# Patient Record
Sex: FEMALE | Race: WHITE | NOT HISPANIC OR LATINO | Employment: FULL TIME | URBAN - METROPOLITAN AREA
[De-identification: names, ages, dates, MRNs, and addresses within clinical notes are randomized per-mention and may not be internally consistent; named-entity substitution may affect disease eponyms.]

---

## 2021-09-20 ENCOUNTER — APPOINTMENT (OUTPATIENT)
Dept: WOMENS IMAGING | Facility: HOSPITAL | Age: 41
End: 2021-09-20

## 2021-09-20 PROCEDURE — 77063 BREAST TOMOSYNTHESIS BI: CPT | Performed by: RADIOLOGY

## 2021-09-20 PROCEDURE — 77067 SCR MAMMO BI INCL CAD: CPT | Performed by: RADIOLOGY

## 2021-11-24 ENCOUNTER — APPOINTMENT (OUTPATIENT)
Dept: WOMENS IMAGING | Facility: HOSPITAL | Age: 41
End: 2021-11-24

## 2021-11-24 PROCEDURE — 76641 ULTRASOUND BREAST COMPLETE: CPT | Performed by: RADIOLOGY

## 2021-11-24 PROCEDURE — 77065 DX MAMMO INCL CAD UNI: CPT | Performed by: RADIOLOGY

## 2021-11-24 PROCEDURE — 77061 BREAST TOMOSYNTHESIS UNI: CPT | Performed by: RADIOLOGY

## 2021-11-24 PROCEDURE — G0279 TOMOSYNTHESIS, MAMMO: HCPCS | Performed by: RADIOLOGY

## 2022-05-06 ENCOUNTER — OFFICE VISIT (OUTPATIENT)
Dept: FAMILY MEDICINE CLINIC | Age: 42
End: 2022-05-06

## 2022-05-06 ENCOUNTER — LAB (OUTPATIENT)
Dept: LAB | Facility: HOSPITAL | Age: 42
End: 2022-05-06

## 2022-05-06 VITALS
HEIGHT: 67 IN | HEART RATE: 82 BPM | DIASTOLIC BLOOD PRESSURE: 73 MMHG | BODY MASS INDEX: 21.6 KG/M2 | WEIGHT: 137.6 LBS | OXYGEN SATURATION: 97 % | SYSTOLIC BLOOD PRESSURE: 107 MMHG

## 2022-05-06 DIAGNOSIS — F41.1 ANXIETY, GENERALIZED: ICD-10-CM

## 2022-05-06 DIAGNOSIS — Z13.6 SCREENING FOR CARDIOVASCULAR CONDITION: ICD-10-CM

## 2022-05-06 DIAGNOSIS — Z00.00 ROUTINE GENERAL MEDICAL EXAMINATION AT A HEALTH CARE FACILITY: Primary | ICD-10-CM

## 2022-05-06 DIAGNOSIS — M79.671 RIGHT FOOT PAIN: ICD-10-CM

## 2022-05-06 LAB
ALBUMIN SERPL-MCNC: 4.3 G/DL (ref 3.5–5.2)
ALBUMIN/GLOB SERPL: 1.7 G/DL
ALP SERPL-CCNC: 52 U/L (ref 39–117)
ALT SERPL W P-5'-P-CCNC: 10 U/L (ref 1–33)
ANION GAP SERPL CALCULATED.3IONS-SCNC: 8.8 MMOL/L (ref 5–15)
AST SERPL-CCNC: 17 U/L (ref 1–32)
BILIRUB SERPL-MCNC: 0.2 MG/DL (ref 0–1.2)
BUN SERPL-MCNC: 10 MG/DL (ref 6–20)
BUN/CREAT SERPL: 13.7 (ref 7–25)
CALCIUM SPEC-SCNC: 9 MG/DL (ref 8.6–10.5)
CHLORIDE SERPL-SCNC: 105 MMOL/L (ref 98–107)
CO2 SERPL-SCNC: 25.2 MMOL/L (ref 22–29)
CREAT SERPL-MCNC: 0.73 MG/DL (ref 0.57–1)
EGFRCR SERPLBLD CKD-EPI 2021: 106.1 ML/MIN/1.73
GLOBULIN UR ELPH-MCNC: 2.5 GM/DL
GLUCOSE SERPL-MCNC: 94 MG/DL (ref 65–99)
POTASSIUM SERPL-SCNC: 4.6 MMOL/L (ref 3.5–5.2)
PROT SERPL-MCNC: 6.8 G/DL (ref 6–8.5)
SODIUM SERPL-SCNC: 139 MMOL/L (ref 136–145)

## 2022-05-06 PROCEDURE — 36415 COLL VENOUS BLD VENIPUNCTURE: CPT

## 2022-05-06 PROCEDURE — 80053 COMPREHEN METABOLIC PANEL: CPT

## 2022-05-06 PROCEDURE — 99386 PREV VISIT NEW AGE 40-64: CPT | Performed by: NURSE PRACTITIONER

## 2022-05-06 RX ORDER — SPIRONOLACTONE 50 MG/1
TABLET, FILM COATED ORAL
COMMUNITY
Start: 2022-04-11

## 2022-05-06 RX ORDER — BETAMETHASONE DIPROPIONATE 0.05 %
OINTMENT (GRAM) TOPICAL
COMMUNITY
Start: 2022-03-28

## 2022-05-06 NOTE — PROGRESS NOTES
Assessment and Plan    Diagnoses and all orders for this visit:    1. Routine general medical examination at a health care facility (Primary)  Comments:  continue diet and exercise , annual exam    2. Anxiety, generalized  Comments:  low dose, recommend at minimum - 6 month treatment  follow up 6 weeks for evaluation   Orders:  -     sertraline (ZOLOFT) 50 MG tablet; Take 1 tablet by mouth Daily. Take 1/2 tab daily x 3-5 days, then increase to 1 tab daily  Dispense: 30 tablet; Refill: 1    3. Right foot pain  Comments:  continue to follow up with podiatry.     4. Screening for cardiovascular condition  -     Comprehensive metabolic panel; Future        Follow Up   Return in about 6 weeks (around 6/17/2022).    Chief Complaint  Radha Yancey presents to [facility] for Establish Care, Foot Injury, and Pain (Pt complaint of right foot pain ongoing for approximately 2 years, no known injury. )    Subjective          Foot pain:  Right foot - has been going on for several years , but worse  Over the past month.      Did have Podiatrist Dr. Flores group about a month ago - was told may need surgery but needed a primary care for 'physical'/ wellness check before proceeding forward.      Radha is here today for annual exam.     Last annual exam was unknown  Last eye exam: 1.5 years  PROVIDER:  Dr. Camilo  Last dental exam:   6 months    PROVIDER:  Dr. Cervantes    DECLINES the following health maintenance recommendations:   n/a    Diet / exercise:   Work out 4 times per week     Patient's Body mass index is 21.55 kg/m². indicating that she is within normal range (BMI 18.5-24.9). No BMI management plan needed..  Satisfied with weight?  yes    Patient Care Team:  Vickie Gibson APRN as PCP - General (Nurse Practitioner)  Maxine Daniel MD as Consulting Physician (Obstetrics and Gynecology)   She did have a biohealth screen at work and had cholesterol and BS checked and was not told  "of any abnormality.          Review of Systems   Constitutional: Negative for fatigue, fever, unexpected weight gain and unexpected weight loss.   HENT: Negative for ear discharge and sinus pressure.    Eyes: Positive for visual disturbance (contacts). Negative for blurred vision.   Respiratory: Negative for cough.    Cardiovascular: Negative for chest pain and leg swelling.   Gastrointestinal: Positive for constipation (will manage with OTC Miralax ). Negative for diarrhea and nausea.   Genitourinary: Positive for menstrual problem (LMP 2 days ago). Negative for dysuria and urgency.   Musculoskeletal: Positive for arthralgias (left shoulder ).   Skin: Negative for rash and skin lesions.   Allergic/Immunologic: Negative for environmental allergies.   Neurological: Negative for dizziness and headache.   Psychiatric/Behavioral: Negative for sleep disturbance, suicidal ideas and depressed mood. The patient is nervous/anxious (maybe increase with covid).        Objective     Vitals:    05/06/22 0955   BP: 107/73   BP Location: Left arm   Patient Position: Sitting   Cuff Size: Adult   Pulse: 82   SpO2: 97%   Weight: 62.4 kg (137 lb 9.6 oz)   Height: 170.2 cm (67\")     Body mass index is 21.55 kg/m².     Physical Exam  Vitals reviewed.   Constitutional:       Appearance: Normal appearance. She is well-developed. She is not ill-appearing.   HENT:      Head: Normocephalic and atraumatic.      Right Ear: Tympanic membrane, ear canal and external ear normal.      Left Ear: Tympanic membrane, ear canal and external ear normal.      Mouth/Throat:      Lips: Pink.      Mouth: Mucous membranes are moist.      Pharynx: Oropharynx is clear. Uvula midline. No oropharyngeal exudate.   Eyes:      Extraocular Movements: Extraocular movements intact.      Conjunctiva/sclera: Conjunctivae normal.      Pupils: Pupils are equal, round, and reactive to light.   Neck:      Thyroid: No thyromegaly.   Cardiovascular:      Rate and Rhythm: " Normal rate and regular rhythm.      Heart sounds: No murmur heard.    No friction rub. No gallop.   Pulmonary:      Effort: Pulmonary effort is normal.      Breath sounds: Normal breath sounds. No wheezing or rhonchi.   Abdominal:      General: Bowel sounds are normal. There is no distension.      Palpations: Abdomen is soft.      Tenderness: There is no abdominal tenderness.   Musculoskeletal:      Cervical back: Normal range of motion.   Lymphadenopathy:      Head:      Right side of head: No submandibular adenopathy.      Left side of head: No submandibular adenopathy.      Cervical: No cervical adenopathy.   Skin:     General: Skin is warm and dry.      Findings: No lesion or rash.   Neurological:      Mental Status: She is alert and oriented to person, place, and time.      Cranial Nerves: No cranial nerve deficit.      Gait: Gait is intact.   Psychiatric:         Mood and Affect: Mood and affect normal.         Behavior: Behavior normal.         Thought Content: Thought content normal.         Judgment: Judgment normal.         Result Review :                    No Known Allergies   Past Medical History:   Diagnosis Date   • Foot pain      Current Outpatient Medications   Medication Sig Dispense Refill   • betamethasone dipropionate (DIPROLENE) 0.05 % ointment APPLY TOPICALLY TO THE AFFECTED AREA TWICE DAILY FOR 14 DAYS     • spironolactone (ALDACTONE) 50 MG tablet      • sertraline (ZOLOFT) 50 MG tablet Take 1 tablet by mouth Daily. Take 1/2 tab daily x 3-5 days, then increase to 1 tab daily 30 tablet 1     No current facility-administered medications for this visit.     History reviewed. No pertinent surgical history.   Social History     Tobacco Use   • Smoking status: Never Smoker   • Smokeless tobacco: Never Used   Vaping Use   • Vaping Use: Never used   Substance Use Topics   • Alcohol use: Never   • Drug use: Never     Family History   Problem Relation Age of Onset   • Hyperlipidemia Mother    • Cancer  Father    • Heart disease Maternal Grandmother 64        Mi   • Cancer Paternal Grandfather         lung cancer     Health Maintenance Due   Topic Date Due   • HEPATITIS C SCREENING  Never done      Immunization History   Administered Date(s) Administered   • COVID-19 (MODERNA) 1st, 2nd, 3rd Dose Only 02/05/2021, 03/05/2021   • COVID-19 (MODERNA) BOOSTER 11/23/2021   • Tdap 11/26/2014